# Patient Record
Sex: FEMALE | Race: BLACK OR AFRICAN AMERICAN
[De-identification: names, ages, dates, MRNs, and addresses within clinical notes are randomized per-mention and may not be internally consistent; named-entity substitution may affect disease eponyms.]

---

## 2017-10-20 ENCOUNTER — HOSPITAL ENCOUNTER (OUTPATIENT)
Dept: HOSPITAL 17 - NEPD | Age: 37
Setting detail: OBSERVATION
LOS: 1 days | Discharge: HOME | End: 2017-10-21
Attending: INTERNAL MEDICINE | Admitting: INTERNAL MEDICINE
Payer: MEDICARE

## 2017-10-20 VITALS — BODY MASS INDEX: 29.05 KG/M2 | HEIGHT: 66 IN | WEIGHT: 180.78 LBS

## 2017-10-20 DIAGNOSIS — R07.9: Primary | ICD-10-CM

## 2017-10-20 DIAGNOSIS — R56.9: ICD-10-CM

## 2017-10-20 DIAGNOSIS — Z79.899: ICD-10-CM

## 2017-10-20 DIAGNOSIS — R94.31: ICD-10-CM

## 2017-10-20 DIAGNOSIS — F41.9: ICD-10-CM

## 2017-10-20 DIAGNOSIS — F31.9: ICD-10-CM

## 2017-10-20 DIAGNOSIS — D64.9: ICD-10-CM

## 2017-10-20 PROCEDURE — 82550 ASSAY OF CK (CPK): CPT

## 2017-10-20 PROCEDURE — 85730 THROMBOPLASTIN TIME PARTIAL: CPT

## 2017-10-20 PROCEDURE — 93017 CV STRESS TEST TRACING ONLY: CPT

## 2017-10-20 PROCEDURE — 85610 PROTHROMBIN TIME: CPT

## 2017-10-20 PROCEDURE — 80048 BASIC METABOLIC PNL TOTAL CA: CPT

## 2017-10-20 PROCEDURE — 71010: CPT

## 2017-10-20 PROCEDURE — 82552 ASSAY OF CPK IN BLOOD: CPT

## 2017-10-20 PROCEDURE — 84702 CHORIONIC GONADOTROPIN TEST: CPT

## 2017-10-20 PROCEDURE — 93005 ELECTROCARDIOGRAM TRACING: CPT

## 2017-10-20 PROCEDURE — 85025 COMPLETE CBC W/AUTO DIFF WBC: CPT

## 2017-10-20 PROCEDURE — G0378 HOSPITAL OBSERVATION PER HR: HCPCS

## 2017-10-20 PROCEDURE — 99285 EMERGENCY DEPT VISIT HI MDM: CPT

## 2017-10-20 PROCEDURE — 84484 ASSAY OF TROPONIN QUANT: CPT

## 2017-10-21 VITALS
RESPIRATION RATE: 16 BRPM | TEMPERATURE: 98.5 F | SYSTOLIC BLOOD PRESSURE: 128 MMHG | HEART RATE: 83 BPM | DIASTOLIC BLOOD PRESSURE: 90 MMHG | OXYGEN SATURATION: 97 %

## 2017-10-21 VITALS — HEART RATE: 67 BPM

## 2017-10-21 VITALS
TEMPERATURE: 97.8 F | DIASTOLIC BLOOD PRESSURE: 73 MMHG | SYSTOLIC BLOOD PRESSURE: 122 MMHG | RESPIRATION RATE: 17 BRPM | HEART RATE: 81 BPM | OXYGEN SATURATION: 99 %

## 2017-10-21 VITALS
TEMPERATURE: 97.9 F | RESPIRATION RATE: 20 BRPM | DIASTOLIC BLOOD PRESSURE: 75 MMHG | OXYGEN SATURATION: 96 % | SYSTOLIC BLOOD PRESSURE: 128 MMHG | HEART RATE: 82 BPM

## 2017-10-21 VITALS — OXYGEN SATURATION: 97 %

## 2017-10-21 VITALS — HEART RATE: 85 BPM

## 2017-10-21 LAB
ANION GAP SERPL CALC-SCNC: 5 MEQ/L (ref 5–15)
APTT BLD: 25.8 SEC (ref 24.3–30.1)
BASOPHILS # BLD AUTO: 0.1 TH/MM3 (ref 0–0.2)
BASOPHILS # BLD AUTO: 0.1 TH/MM3 (ref 0–0.2)
BASOPHILS NFR BLD: 0.7 % (ref 0–2)
BASOPHILS NFR BLD: 1.1 % (ref 0–2)
BETA HCG QUANT: (no result) MIU/ML (ref 0–5)
BUN SERPL-MCNC: 12 MG/DL (ref 7–18)
CHLORIDE SERPL-SCNC: 108 MEQ/L (ref 98–107)
CK MB SERPL-MCNC: (no result) NG/ML (ref 0.5–3.6)
CK MB SERPL-MCNC: (no result) NG/ML (ref 0.5–3.6)
CK SERPL-CCNC: 105 U/L (ref 26–192)
CK SERPL-CCNC: 115 U/L (ref 26–192)
CK SERPL-CCNC: 137 U/L (ref 26–192)
EOSINOPHIL # BLD: 0.2 TH/MM3 (ref 0–0.4)
EOSINOPHIL # BLD: 0.2 TH/MM3 (ref 0–0.4)
EOSINOPHIL NFR BLD: 2.4 % (ref 0–4)
EOSINOPHIL NFR BLD: 2.6 % (ref 0–4)
ERYTHROCYTE [DISTWIDTH] IN BLOOD BY AUTOMATED COUNT: 14.1 % (ref 11.6–17.2)
ERYTHROCYTE [DISTWIDTH] IN BLOOD BY AUTOMATED COUNT: 14.1 % (ref 11.6–17.2)
GFR SERPLBLD BASED ON 1.73 SQ M-ARVRAT: 99 ML/MIN (ref 89–?)
HCO3 BLD-SCNC: 26.9 MEQ/L (ref 21–32)
HCT VFR BLD CALC: 28.6 % (ref 35–46)
HCT VFR BLD CALC: 30.5 % (ref 35–46)
HEMO FLAGS: (no result)
HEMO FLAGS: (no result)
INR PPP: 0.9 RATIO
LYMPHOCYTES # BLD AUTO: 1.7 TH/MM3 (ref 1–4.8)
LYMPHOCYTES # BLD AUTO: 2.2 TH/MM3 (ref 1–4.8)
LYMPHOCYTES NFR BLD AUTO: 27.1 % (ref 9–44)
LYMPHOCYTES NFR BLD AUTO: 30.3 % (ref 9–44)
MCH RBC QN AUTO: 28.5 PG (ref 27–34)
MCH RBC QN AUTO: 28.6 PG (ref 27–34)
MCHC RBC AUTO-ENTMCNC: 33.1 % (ref 32–36)
MCHC RBC AUTO-ENTMCNC: 33.9 % (ref 32–36)
MCV RBC AUTO: 84.3 FL (ref 80–100)
MCV RBC AUTO: 85.9 FL (ref 80–100)
MONOCYTES NFR BLD: 4.8 % (ref 0–8)
MONOCYTES NFR BLD: 5.3 % (ref 0–8)
NEUTROPHILS # BLD AUTO: 4 TH/MM3 (ref 1.8–7.7)
NEUTROPHILS # BLD AUTO: 4.5 TH/MM3 (ref 1.8–7.7)
NEUTROPHILS NFR BLD AUTO: 61.8 % (ref 16–70)
NEUTROPHILS NFR BLD AUTO: 63.9 % (ref 16–70)
PLATELET # BLD: 390 TH/MM3 (ref 150–450)
PLATELET # BLD: 482 TH/MM3 (ref 150–450)
POTASSIUM SERPL-SCNC: 3.9 MEQ/L (ref 3.5–5.1)
PROTHROMBIN TIME: 10.2 SEC (ref 9.8–11.6)
RBC # BLD AUTO: 3.33 MIL/MM3 (ref 4–5.3)
RBC # BLD AUTO: 3.62 MIL/MM3 (ref 4–5.3)
SODIUM SERPL-SCNC: 140 MEQ/L (ref 136–145)
WBC # BLD AUTO: 6.2 TH/MM3 (ref 4–11)
WBC # BLD AUTO: 7.3 TH/MM3 (ref 4–11)

## 2017-10-21 NOTE — HHI.HP
HPI


Primary Care Physician


No Primary Care Physician


Chief Complaint


Chest pain


History of Present Illness


37-year-old female with history of bipolar, anxiety, and seizure disorder 

presents to emergency room for further evaluation of chest pain.  Onset 8 PM.  

Nonexertional.  Location left anterior chest described as a tense pressure.  

Pain came on quickly.  Associated symptoms included dyspnea and slight nausea. 

No vomiting or diaphoresis. It did not hurt to take a deep breath.  Reports 

radiation to right anterior chest briefly while waiting for [facility] to arrive.  

Duration of left anterior chest pressure constant all night.  Precipitating 

factors possible anxiety attack, reports this discomfort not similar to past 

anxiety attacks.  No known relieving factors.  States discomfort gradually went 

away throughout evening.  Currently she is chest pain free. Has not taken any 

of her bipolar medications 1 month.





Review of Systems


General: No fatigue,weakness, fever, chills, recent illness, or change in 

appetite. Has been in her general state of health, does not feel she is under 

stress or particular anxiety.  


HEENT: No HA


CV: As stated above. No current chest pain or pressure. 


RESP: No SOB, cough, sputum production, or history of asthma


GI: No nausea, vomiting, or bowel changes. No diarrhea, constipation, or pain.  

No unintentional weight gain or weight loss. 


: No dysuria, urgency, frequency, or frequent UTIs. History of kidney stones.


GYN: Last menses early October. History of tubal ligation. Denies chance of 

pregnancy.  Reports regular, heavy periods lasting 7 days.


EXT: No lower leg edema, no paraesthesias


MS: No discomfort or change in ROM


NEURO: History of seizures, last seizure "many years ago." Reports seizures to 

be related to past traumatic injury. No dizziness, difficulty with balance, LOC

, motor/sensory deficits


PSYCH: History of bipolar disorder and anxiety, reports not taking medications 

for over one month due to the inability to pay for medications. follows with 

Anupam Toledo. No depression or suicidal ideation.





Past Family Social History


Allergies:  


Coded Allergies:  


     No Known Allergies (Verified , 10/21/17)


Past Medical History


Bipolar, anxiety, seizure


Past Surgical History


Tubal ligation


Reported Medications





Reported Meds & Active Scripts


Active


Reported has not taken any medications 1 month


Seroquel (Quetiapine Fumarate) 25 Mg Tab 25 Mg PO HS


Lamictal (Lamotrigine) 200 Mg Tab 200 Mg PO DAILY


Vistaril (Hydroxyzine Pamoate) 25 Mg Cap 25 Mg PO TID PRN


Lexapro (Escitalopram Oxalate) 10 Mg Tab 10 Mg PO DAILY


Active Ordered Medications





Current Medications








 Medications


  (Trade)  Dose


 Ordered  Sig/Renee


 Route  Start Time


 Stop Time Status Last Admin


 


  (NS Flush)  2 ml  UNSCH  PRN


 IV FLUSH  10/21/17 01:15


     


 


 


  (NS Flush)  2 ml  BID


 IV FLUSH  10/21/17 09:00


    10/21/17 07:42


 


 


  (Tylenol)  500 mg  Q4H  PRN


 PO  10/21/17 08:00


     


 


 


  (Zofran Inj)  4 mg  Q6H  PRN


 IV PUSH  10/21/17 08:00


     


 


 


  (Nitrostat Sl)  0.4 mg  Q5M  PRN


 SL  10/21/17 08:00


     


 


 


  (Aspirin)  325 mg  DAILY


 PO  10/22/17 09:00


     


 








Family History


Noncontributory for early onset cardiovascular disease


Social History


No known hypertension, diabetes, or hyperlipidemia.


Lifelong nonsmoker.  Rare alcohol use.  Denies any illegal drug use.


Endorses sedentary lifestyle.





Past cardiac testing


None





Physical Exam


Vital Signs





Vital Signs








  Date Time  Temp Pulse Resp B/P (MAP) Pulse Ox O2 Delivery O2 Flow Rate FiO2


 


10/21/17 09:08  67      


 


10/21/17 08:30 97.9 82 20 128/75 (92) 96   


 


10/21/17 04:06  85      


 


10/21/17 03:05 97.8 81 17 122/73 (89) 99   


 


10/21/17 01:49     97   


 


10/21/17 00:01 98.5 83 16 128/90 (103) 97   








Physical Exam


GENERAL: Alert WN, WD, NAD, pleasant,  female


HEAD: NC, AT


CV: RRR, without murmur, rub, gallop, no JVD, S1-S2 no S3-S4.  


RESP: Clear lungs throughout bilateral, no crackles, wheeze, rhonchi, 

symmetrical chest rise, nonlabored, able to speak in full sentences


ABD: Soft, NT, ND, no masses, positive bowel tones


BACK: No CVAT


EXT: Pulses +24, no dependent edema


MS: Normal tone 4 extremities, nontender, no obvious deformities, full range 

of motion


NEURO: CN II through CN XII grossly intact, motor strength 5/5, gait WNL


PSYCH: A+O 3, flat affect, appropriate speech, appropriate mood and affect, 

insight and judgment


SKIN: Normal turgor, normal texture, no lesions, no rashes, brisk cap refill


Laboratory





Laboratory Tests








Test


  10/21/17


00:15 10/21/17


03:10 10/21/17


05:10 10/21/17


07:02


 


White Blood Count 7.3    6.2 


 


Red Blood Count 3.62    3.33 


 


Hemoglobin 10.3    9.5 


 


Hematocrit 30.5    28.6 


 


Mean Corpuscular Volume 84.3    85.9 


 


Mean Corpuscular Hemoglobin 28.6    28.5 


 


Mean Corpuscular Hemoglobin


Concent 33.9 


  


  


  33.1 


 


 


Red Cell Distribution Width 14.1    14.1 


 


Platelet Count 482    390 


 


Mean Platelet Volume 7.9    8.0 


 


Neutrophils (%) (Auto) 61.8    63.9 


 


Lymphocytes (%) (Auto) 30.3    27.1 


 


Monocytes (%) (Auto) 4.8    5.3 


 


Eosinophils (%) (Auto) 2.4    2.6 


 


Basophils (%) (Auto) 0.7    1.1 


 


Neutrophils # (Auto) 4.5    4.0 


 


Lymphocytes # (Auto) 2.2    1.7 


 


Monocytes # (Auto) 0.4    0.3 


 


Eosinophils # (Auto) 0.2    0.2 


 


Basophils # (Auto) 0.1    0.1 


 


CBC Comment DIFF FINAL    DIFF FINAL 


 


Differential Comment      


 


Prothrombin Time 10.2    


 


Prothromb Time International


Ratio 0.9 


  


  


  


 


 


Activated Partial


Thromboplast Time 25.8 


  


  


  


 


 


Blood Urea Nitrogen 12    


 


Creatinine 0.80    


 


Random Glucose 108    


 


Calcium Level 8.0    


 


Sodium Level 140    


 


Potassium Level 3.9    


 


Chloride Level 108    


 


Carbon Dioxide Level 26.9    


 


Anion Gap 5    


 


Estimat Glomerular Filtration


Rate 99 


  


  


  


 


 


Total Creatine Kinase 137  105  115  


 


Creatine Kinase MB LESS THAN 0.5  LESS THAN 0.5   


 


Troponin I LESS THAN 0.02  LESS THAN 0.02  LESS THAN 0.02  


 


Human Chorionic Gonadotropin,


Quant LESS THAN 1 


  


  


  


 








Result Diagram:  


10/21/17 0702                                                                  

              10/21/17 0015





Imaging





Last Impressions








Chest X-Ray 10/21/17 0007 Signed





Impressions: 





 Service Date/Time:  2017 01:21 - CONCLUSION: No acute 





 disease.       Luis Manuel Arnold MD 








Course


EKG


Normal sinus rhythm, normal axis, no ST or T-segment changes





Caprini VTE Risk Assessment


Caprini VTE Risk Assessment:  No/Low Risk (score <= 1)


Caprini Risk Assessment Model











 Point Value = 1          Point Value = 2  Point Value = 3  Point Value = 5


 


Age 41-60


Minor surgery


BMI > 25 kg/m2


Swollen legs


Varicose veins


Pregnancy or postpartum


History of unexplained or recurrent


   spontaneous 


Oral contraceptives or hormone


   replacement


Sepsis (< 1 month)


Serious lung disease, including


   pneumonia (< 1 month)


Abnormal pulmonary function


Acute myocardial infarction


Congestive heart failure (< 1 month)


History of inflammatory bowel disease


Medical patient at bed rest Age 61-74


Arthroscopic surgery


Major open surgery (> 45 min)


Laparoscopic surgery (> 45 min)


Malignancy


Confined to bed (> 72 hours)


Immobilizing plaster cast


Central venous access Age >= 75


History of VTE


Family history of VTE


Factor V Leiden


Prothrombin [id number]


Lupus anticoagulant


Anticardiolipin antibodies


Elevated serum homocysteine


Heparin-induced thrombocytopenia


Other congenital or acquired


   thrombophilia Stroke (< 1 month)


Elective arthroplasty


Hip, pelvis, or leg fracture


Acute spinal cord injury (< 1 month)








Prophylaxis Regimen











   Total Risk


Factor Score Risk Level Prophylaxis Regimen


 


0-1      Low Early ambulation


 


2 Moderate Order ONE of the following:


*Sequential Compression Device (SCD)


*Heparin 5000 units SQ BID


 


3-4 Higher Order ONE of the following medications:


*Heparin 5000 units SQ TID


*Enoxaparin/Lovenox 40 mg SQ daily (WT < 150 kg, CrCl > 30 mL/min)


*Enoxaparin/Lovenox 30 mg SQ daily (WT < 150 kg, CrCl > 10-29 mL/min)


*Enoxaparin/Lovenox 30 mg SQ BID (WT < 150 kg, CrCl > 30 mL/min)


AND/OR


*Sequential Compression Device (SCD)


 


5 or more Highest Order ONE of the following medications:


*Heparin 5000 units SQ TID (Preferred with Epidurals)


*Enoxaparin/Lovenox 40 mg SQ daily (WT < 150 kg, CrCl > 30 mL/min)


*Enoxaparin/Lovenox 30 mg SQ daily (WT < 150 kg, CrCl > 10-29 mL/min)


*Enoxaparin/Lovenox 30 mg SQ BID (WT < 150 kg, CrCl > 30 mL/min)


AND


*Sequential Compression Device (SCD)











Assessment and Plan


Assessment and Plan


#1 Atypical chest pain-admitted to chest pain center.  Ruled out with 3 sets of 

EKGs, cardiac enzymes, and monitored overnight.  Seen and evaluated by Dr. Jc Grossman.  Completed exercise stress test which did not suggest ischemia. 

Reassurance provided. Plans to discharge home with follow to PCP. 


#2 Anxiety-continue home medications. Follows with Anupam Toledo, 

prescriptions are current. Reports medications are reasonable priced at $4.00/

each but due to her limited income is unable to afford. Case management placed 

for possible assistance with medications. Follow up with Anupam Toledo as 

previously instructed. 


#3 Anemia-anemia appears to be due to blood loss. No micro or macrocytic 

changes. History of anemia according to past laboratory results.  Highly 

encouraged and stressed the importance of establishing with a GYN, as well as a 

PCP.


Stressed the importance of obtaining a PCP for routine medical care.  

Encouraged to continue looking for a PCP that accepts her insurance.











Elizabeth Singh Oct 21, 2017 10:16

## 2017-10-21 NOTE — EKG
Date Performed: 10/21/2017       Time Performed: 03:15:18

 

PTAGE:      37 years

 

EKG:      Sinus rhythm 

 

 NORMAL ECG

 

PREVIOUS TRACING       : 08/28/2015 11.44 Since previous tracing, no significant change noted

 

DOCTOR:   Jc Grossman  Interpretating Date/Time  10/23/2017 08:13:28

## 2017-10-21 NOTE — HHI.DCPOC
Discharge Care Plan


Diagnosis:  


(1) Atypical chest pain


(2) Anxiety


Goals to Promote Your Health


* To prevent worsening of your condition and complications


* To maintain your health at the optimal level


Directions to Meet Your Goals


*** Take your medications as prescribed


*** Follow your dietary instruction


*** Follow activity as directed








*** Keep your appointments as scheduled


*** Take your immunizations and boosters as scheduled


*** If your symptoms worsen call your PCP, if no PCP go to Urgent Care Center 

or Emergency Room***


*** Smoking is Dangerous to Your Health. Avoid second hand smoke***


***Call the 24-hour hour crisis hotline for domestic abuse at 1-482.456.9099***











Elizabeth Singh Oct 21, 2017 12:49

## 2017-10-21 NOTE — PD
HPI


Chief Complaint:  Anxiety


Time Seen by Provider:  00:03


Travel History


International Travel<30 days:  No


Contact w/Intl Traveler<30days:  No





History of Present Illness


HPI


37-year-old female with history of bipolar disorder, anxiety, has not taken any 

of her medications including Lexapro and Seroquel in over a month, here by 

ambulance for evaluation of possible anxiety attack.  The patient states that 

she has not had an anxiety attack in over 4 months.  At around 9:30 PM she 

began to feel a tense sensation in her chest and became short of breath.  She 

states that the sensation went to her right side of her chest, and is now 

located on her left side of her chest.  The sensation is described as a tense 

feeling which is worse with movements and palpation.  She is no longer short of 

breath.  She feels somewhat improved.  No fevers, chills, cough, or recent 

illness.  No known history of cardiac disease.  No family history of cardiac 

disease.  She is a nonsmoker.





PFSH


Past Medical History


Blood Disorders:  No


Bipolar Disorder:  Yes


Anxiety:  Yes


Depression:  Yes


Cancer:  Yes (CERVIX-- PAP SMEAR RECOMMENDED Q 6 MOS )


Cardiovascular Problems:  No


Chemotherapy:  No


Cerebrovascular Accident:  No


Diminished Hearing:  No


Endocrine:  No


Glaucoma:  No


Genitourinary:  Yes


Immune Disorder:  No


Kidney Stones:  Yes


Musculoskeletal:  No


Neurologic:  No


Psychiatric:  Yes


Reproductive:  No


Respiratory:  No


Radiation Therapy:  No


Seizures:  Yes


PNEUMOCCOCAL Vaccine (Year):  2


Pregnant?:  Not Pregnant


LMP:  EARLY OCT 2017


:  5


Para:  3


Miscarriage:  2


:  0


Dilation and Curettage (D&C):  Yes


Tubal Ligation:  Yes





Past Surgical History


AICD:  No


Arteriovenous Shunt:  No


Genitourinary Surgery:  Yes (KIDNEY STONE)


Gynecologic Surgery:  Yes (CERCLAGE SURG X 2)


Insulin Pump:  No


Joint Replacement:  No


Pacemaker:  No


Other Surgery:  Yes





Social History


Alcohol Use:  Yes (OCC)


Tobacco Use:  No


Substance Use:  No





Allergies-Medications


(Allergen,Severity, Reaction):  


Coded Allergies:  


     No Known Allergies (Verified , 10/21/17)


Reported Meds & Prescriptions





Reported Meds & Active Scripts


Active


Reported


Seroquel (Quetiapine Fumarate) 25 Mg Tab 25 Mg PO HS


Lamictal (Lamotrigine) 200 Mg Tab 200 Mg PO DAILY


Vistaril (Hydroxyzine Pamoate) 25 Mg Cap 25 Mg PO TID PRN


Lexapro (Escitalopram Oxalate) 10 Mg Tab 10 Mg PO DAILY








Review of Systems


Except as stated in HPI:  all other systems reviewed are Neg





Physical Exam


Narrative


GENERAL: Well-developed, well-nourished, comfortable, no apparent distress.


SKIN: Focused skin assessment warm/dry.


HEAD: Atraumatic. Normocephalic. 


EYES: Pupils equal and round. No scleral icterus. No injection or drainage. 


ENT: Mucous membranes pink and moist.


NECK: Trachea midline. No JVD. 


CARDIOVASCULAR: Regular rate and rhythm.  Distal pulses brisk and equal 

bilaterally.


RESPIRATORY: No accessory muscle use. Clear to auscultation. Breath sounds 

equal bilaterally. 


GASTROINTESTINAL: Abdomen soft, non-tender, nondistended. 


MUSCULOSKELETAL: No obvious deformities. No clubbing.  No cyanosis.  No edema.  

Mild left anterior chest wall tenderness without crepitus, without step-off, 

without paradoxical chest wall movements.  


NEUROLOGICAL: Awake and alert. No obvious cranial nerve deficits.  Motor 

grossly within normal limits. Normal speech.


PSYCHIATRIC: Appropriate mood and affect; insight and judgment normal.





Data


Data


Last Documented VS





Vital Signs








  Date Time  Temp Pulse Resp B/P (MAP) Pulse Ox O2 Delivery O2 Flow Rate FiO2


 


10/21/17 00:01 98.5 83 16 128/90 (103) 97   








Orders





 Orders


Electrocardiogram (10/21/17 00:07)


Basic Metabolic Panel (Bmp) (10/21/17 00:07)


Ckmb (Isoenzyme) Profile (10/21/17 00:07)


Complete Blood Count With Diff (10/21/17 00:07)


Prothrombin Time / Inr (Pt) (10/21/17 00:07)


Act Partial Throm Time (Ptt) (10/21/17 00:07)


Troponin I (10/21/17 00:07)


Chest, Single Ap (10/21/17 00:07)


Ecg Monitoring (10/21/17 00:07)


Iv Access Insert/Monitor (10/21/17 00:07)


Oximetry (10/21/17 00:07)


Sodium Chloride 0.9% Flush (Ns Flush) (10/21/17 00:15)


Lorazepam (Ativan) (10/21/17 00:15)


Beta Hcg (Quant/Titer) (10/21/17 00:07)


CKMB (10/21/17 00:15)


CKMB% (10/21/17 00:15)





Labs





Laboratory Tests








Test


  10/21/17


00:15


 


White Blood Count 7.3 TH/MM3 


 


Red Blood Count 3.62 MIL/MM3 


 


Hemoglobin 10.3 GM/DL 


 


Hematocrit 30.5 % 


 


Mean Corpuscular Volume 84.3 FL 


 


Mean Corpuscular Hemoglobin 28.6 PG 


 


Mean Corpuscular Hemoglobin


Concent 33.9 % 


 


 


Red Cell Distribution Width 14.1 % 


 


Platelet Count 482 TH/MM3 


 


Mean Platelet Volume 7.9 FL 


 


Neutrophils (%) (Auto) 61.8 % 


 


Lymphocytes (%) (Auto) 30.3 % 


 


Monocytes (%) (Auto) 4.8 % 


 


Eosinophils (%) (Auto) 2.4 % 


 


Basophils (%) (Auto) 0.7 % 


 


Neutrophils # (Auto) 4.5 TH/MM3 


 


Lymphocytes # (Auto) 2.2 TH/MM3 


 


Monocytes # (Auto) 0.4 TH/MM3 


 


Eosinophils # (Auto) 0.2 TH/MM3 


 


Basophils # (Auto) 0.1 TH/MM3 


 


CBC Comment DIFF FINAL 


 


Differential Comment  


 


Prothrombin Time 10.2 SEC 


 


Prothromb Time International


Ratio 0.9 RATIO 


 


 


Activated Partial


Thromboplast Time 25.8 SEC 


 


 


Blood Urea Nitrogen 12 MG/DL 


 


Creatinine 0.80 MG/DL 


 


Random Glucose 108 MG/DL 


 


Calcium Level 8.0 MG/DL 


 


Sodium Level 140 MEQ/L 


 


Potassium Level 3.9 MEQ/L 


 


Chloride Level 108 MEQ/L 


 


Carbon Dioxide Level 26.9 MEQ/L 


 


Anion Gap 5 MEQ/L 


 


Estimat Glomerular Filtration


Rate 99 ML/MIN 


 


 


Total Creatine Kinase 137 U/L 


 


Creatine Kinase MB


  LESS THAN 0.5


NG/ML


 


Troponin I


  LESS THAN 0.02


NG/ML


 


Human Chorionic Gonadotropin,


Quant LESS THAN 1


MIU/ML











MDM


Medical Decision Making


Medical Screen Exam Complete:  Yes


Emergency Medical Condition:  Yes


Medical Record Reviewed:  Yes


Interpretation(s)


EKG: Sinus, rate 82, leftward axis, normal intervals, low QRS voltages in 

precordial leads, no acute ischemic abnormality.


Differential Diagnosis


ACS, pneumothorax, pericarditis, PE, pneumonia, anxiety


Narrative Course


Vital signs show heart rate 83, blood pressure 128/90, pulse ox 97% on room air

, oral temp of 98.5F.





CBC: WBC 7.3, hemoglobin 10.3, hematocrit 30.5, platelets 482.


BMP is unremarkable.


Cardiac enzymes are negative.


Beta hCG is negative.





Chest x-ray:


No acute disease.





Patient reports that her anxiety symptoms have somewhat improved after 

receiving Ativan.  She has had intermittent left-sided chest discomfort while 

in the emergency department.  Because of this I believe she is a good candidate 

for further cardiac evaluation in the chest pain center.  She is amenable to 

this plan.  She was given a full dose of aspirin.





Diagnosis





 Primary Impression:  


 Chest pain


 Qualified Codes:  R07.9 - Chest pain, unspecified


 Additional Impression:  


 Anxiety





Admitting Information


Admitting Physician Requests:  Alejandro Serra MD Oct 21, 2017 00:12

## 2017-10-21 NOTE — TR
Date Performed: 10/21/2017       Time Performed: 11:49:13

 

DOCTOR:      Jc Grossman 

 

DRUG LIST:     

CLINICAL HISTORY:      CHEST PAIN

REASON FOR TEST:      Chest pain

REASON FOR ENDING:     

OBSERVATION:     

CONCLUSION:      Sam protocol completed. Stopped sec to reaching target heart rate and leg fatigue.
 Maximum DQ=419 Target HR Achieved=86.0% Maximum CZ=529/80 Total Exercise Time=7:01. No reprod chest 
discomfort. No ectopy. No st t segment changes to sugg ischemia. Normal bp response. Good exercise to
lerance. Recovery quick. Reports "slight tense sensation left anterior chest, no radiation, no assoc 
symptoms, duration less minute. No correlation of chest discomfort with EKG changes.

COMMENTS:      Conclusion: Normal treadmill exercise.  No evidence of ischemia.

## 2017-10-21 NOTE — RADRPT
EXAM DATE/TIME:  10/21/2017 01:21 

 

[facility] COMPARISON:     

CHEST SINGLE AP, August 28, 2015, 11:50.

 

                     

INDICATIONS :     

Chest pain.

                     

 

MEDICAL HISTORY :            

Anxiety. Bipolar.   

 

SURGICAL HISTORY :     

None.   

 

ENCOUNTER:     

Initial                                        

 

ACUITY:     

1 day      

 

PAIN SCORE:     

3/10

 

LOCATION:     

Bilateral chest 

 

FINDINGS:     

A single view of the chest demonstrates the lungs to be symmetrically aerated without evidence of mas
s, infiltrate or effusion.  The cardiomediastinal contours are unremarkable.  Osseous structures are 
intact.

 

CONCLUSION:     No acute disease.  

 

 

 

 Luis Manuel Arnold MD on October 21, 2017 at 0:54           

Board Certified Radiologist.

 This report was verified electronically.

## 2017-10-21 NOTE — EKG
Date Performed: 10/21/2017       Time Performed: 00:29:45

 

PTAGE:      37 years

 

EKG:      Sinus rhythm 

 

 LOW QRS VOLTAGE IN PRECORDIAL LEADS BORDERLINE ECG INTERPRETATION BASED ON A DEFAULT AGE OF 40 YEARS
 Compared to 

 

 PREVIOUS TRACING             ,low voltage is new.

 

DOCTOR:   Jc Grossman  Interpretating Date/Time  10/21/2017 14:15:41

## 2017-10-21 NOTE — EKG
Date Performed: 10/21/2017       Time Performed: 06:10:06

 

PTAGE:      37 years

 

EKG:      Sinus rhythm 

 

 NORMAL ECG

 

PREVIOUS TRACING       : 10/21/2017 03.15 Since previous tracing, no significant change noted

 

DOCTOR:   Jc Grossman  Interpretating Date/Time  10/21/2017 14:09:41

## 2018-03-18 ENCOUNTER — HOSPITAL ENCOUNTER (OUTPATIENT)
Dept: HOSPITAL 17 - NEPE | Age: 38
Setting detail: OBSERVATION
LOS: 2 days | Discharge: HOME | End: 2018-03-20
Attending: HOSPITALIST | Admitting: HOSPITALIST
Payer: MEDICARE

## 2018-03-18 VITALS
SYSTOLIC BLOOD PRESSURE: 123 MMHG | HEART RATE: 81 BPM | RESPIRATION RATE: 16 BRPM | DIASTOLIC BLOOD PRESSURE: 60 MMHG | TEMPERATURE: 97.8 F | OXYGEN SATURATION: 100 %

## 2018-03-18 VITALS
SYSTOLIC BLOOD PRESSURE: 103 MMHG | HEART RATE: 118 BPM | DIASTOLIC BLOOD PRESSURE: 62 MMHG | TEMPERATURE: 100.3 F | OXYGEN SATURATION: 99 % | RESPIRATION RATE: 16 BRPM

## 2018-03-18 VITALS
SYSTOLIC BLOOD PRESSURE: 115 MMHG | HEART RATE: 120 BPM | RESPIRATION RATE: 17 BRPM | DIASTOLIC BLOOD PRESSURE: 64 MMHG | OXYGEN SATURATION: 99 % | TEMPERATURE: 99.4 F

## 2018-03-18 VITALS
SYSTOLIC BLOOD PRESSURE: 122 MMHG | DIASTOLIC BLOOD PRESSURE: 69 MMHG | TEMPERATURE: 100.2 F | HEART RATE: 124 BPM | OXYGEN SATURATION: 97 % | RESPIRATION RATE: 18 BRPM

## 2018-03-18 VITALS
SYSTOLIC BLOOD PRESSURE: 155 MMHG | DIASTOLIC BLOOD PRESSURE: 80 MMHG | RESPIRATION RATE: 17 BRPM | OXYGEN SATURATION: 98 % | HEART RATE: 115 BPM

## 2018-03-18 VITALS
DIASTOLIC BLOOD PRESSURE: 75 MMHG | RESPIRATION RATE: 16 BRPM | HEART RATE: 111 BPM | OXYGEN SATURATION: 99 % | TEMPERATURE: 98.9 F | SYSTOLIC BLOOD PRESSURE: 122 MMHG

## 2018-03-18 VITALS — BODY MASS INDEX: 22.72 KG/M2 | WEIGHT: 158.73 LBS | HEIGHT: 70 IN

## 2018-03-18 DIAGNOSIS — F31.9: ICD-10-CM

## 2018-03-18 DIAGNOSIS — N20.0: Primary | ICD-10-CM

## 2018-03-18 DIAGNOSIS — F12.90: ICD-10-CM

## 2018-03-18 DIAGNOSIS — N13.2: ICD-10-CM

## 2018-03-18 DIAGNOSIS — F41.9: ICD-10-CM

## 2018-03-18 DIAGNOSIS — N30.00: ICD-10-CM

## 2018-03-18 DIAGNOSIS — Z85.41: ICD-10-CM

## 2018-03-18 LAB
ALBUMIN SERPL-MCNC: 3.6 GM/DL (ref 3.4–5)
ALP SERPL-CCNC: 62 U/L (ref 45–117)
ALT SERPL-CCNC: 15 U/L (ref 10–53)
AMORPHOUS SEDIMENT, URINE: (no result)
AST SERPL-CCNC: 14 U/L (ref 15–37)
BACTERIA #/AREA URNS HPF: (no result) /HPF
BASOPHILS # BLD AUTO: 0.1 TH/MM3 (ref 0–0.2)
BASOPHILS NFR BLD: 0.6 % (ref 0–2)
BILIRUB SERPL-MCNC: 0.2 MG/DL (ref 0.2–1)
BUN SERPL-MCNC: 12 MG/DL (ref 7–18)
CALCIUM SERPL-MCNC: 8.3 MG/DL (ref 8.5–10.1)
CHLORIDE SERPL-SCNC: 106 MEQ/L (ref 98–107)
COLOR UR: YELLOW
CREAT SERPL-MCNC: 1.16 MG/DL (ref 0.5–1)
EOSINOPHIL # BLD: 0.1 TH/MM3 (ref 0–0.4)
EOSINOPHIL NFR BLD: 0.9 % (ref 0–4)
ERYTHROCYTE [DISTWIDTH] IN BLOOD BY AUTOMATED COUNT: 13.5 % (ref 11.6–17.2)
GFR SERPLBLD BASED ON 1.73 SQ M-ARVRAT: 64 ML/MIN (ref 89–?)
GLUCOSE SERPL-MCNC: 130 MG/DL (ref 74–106)
GLUCOSE UR STRIP-MCNC: (no result) MG/DL
HCO3 BLD-SCNC: 25.1 MEQ/L (ref 21–32)
HCT VFR BLD CALC: 31.8 % (ref 35–46)
HGB BLD-MCNC: 10.5 GM/DL (ref 11.6–15.3)
HGB UR QL STRIP: (no result)
KETONES UR STRIP-MCNC: (no result) MG/DL
LYMPHOCYTES # BLD AUTO: 1.9 TH/MM3 (ref 1–4.8)
LYMPHOCYTES NFR BLD AUTO: 17.3 % (ref 9–44)
MCH RBC QN AUTO: 28.6 PG (ref 27–34)
MCHC RBC AUTO-ENTMCNC: 33.2 % (ref 32–36)
MCV RBC AUTO: 86.2 FL (ref 80–100)
MONOCYTE #: 0.4 TH/MM3 (ref 0–0.9)
MONOCYTES NFR BLD: 3.3 % (ref 0–8)
MUCOUS THREADS #/AREA URNS LPF: (no result) /LPF
NEUTROPHILS # BLD AUTO: 8.6 TH/MM3 (ref 1.8–7.7)
NEUTROPHILS NFR BLD AUTO: 77.9 % (ref 16–70)
NITRITE UR QL STRIP: (no result)
PLATELET # BLD: 509 TH/MM3 (ref 150–450)
PMV BLD AUTO: 7.3 FL (ref 7–11)
PROT SERPL-MCNC: 8.2 GM/DL (ref 6.4–8.2)
RBC # BLD AUTO: 3.69 MIL/MM3 (ref 4–5.3)
SODIUM SERPL-SCNC: 140 MEQ/L (ref 136–145)
SP GR UR STRIP: 1.02 (ref 1–1.03)
SQUAMOUS #/AREA URNS HPF: 8 /HPF (ref 0–5)
URINE LEUKOCYTE ESTERASE: (no result)
WBC # BLD AUTO: 11 TH/MM3 (ref 4–11)

## 2018-03-18 PROCEDURE — 87077 CULTURE AEROBIC IDENTIFY: CPT

## 2018-03-18 PROCEDURE — 96366 THER/PROPH/DIAG IV INF ADDON: CPT

## 2018-03-18 PROCEDURE — 96365 THER/PROPH/DIAG IV INF INIT: CPT

## 2018-03-18 PROCEDURE — 74176 CT ABD & PELVIS W/O CONTRAST: CPT

## 2018-03-18 PROCEDURE — 96361 HYDRATE IV INFUSION ADD-ON: CPT

## 2018-03-18 PROCEDURE — 85025 COMPLETE CBC W/AUTO DIFF WBC: CPT

## 2018-03-18 PROCEDURE — 80048 BASIC METABOLIC PNL TOTAL CA: CPT

## 2018-03-18 PROCEDURE — 87186 SC STD MICRODIL/AGAR DIL: CPT

## 2018-03-18 PROCEDURE — 99285 EMERGENCY DEPT VISIT HI MDM: CPT

## 2018-03-18 PROCEDURE — 81001 URINALYSIS AUTO W/SCOPE: CPT

## 2018-03-18 PROCEDURE — 97161 PT EVAL LOW COMPLEX 20 MIN: CPT

## 2018-03-18 PROCEDURE — 83690 ASSAY OF LIPASE: CPT

## 2018-03-18 PROCEDURE — 83735 ASSAY OF MAGNESIUM: CPT

## 2018-03-18 PROCEDURE — 84703 CHORIONIC GONADOTROPIN ASSAY: CPT

## 2018-03-18 PROCEDURE — 80053 COMPREHEN METABOLIC PANEL: CPT

## 2018-03-18 PROCEDURE — 96375 TX/PRO/DX INJ NEW DRUG ADDON: CPT

## 2018-03-18 PROCEDURE — G0378 HOSPITAL OBSERVATION PER HR: HCPCS

## 2018-03-18 PROCEDURE — 87086 URINE CULTURE/COLONY COUNT: CPT

## 2018-03-18 RX ADMIN — Medication SCH ML: at 22:12

## 2018-03-18 RX ADMIN — ESCITALOPRAM OXALATE SCH MG: 10 TABLET, FILM COATED ORAL at 14:09

## 2018-03-18 RX ADMIN — STANDARDIZED SENNA CONCENTRATE AND DOCUSATE SODIUM SCH TAB: 8.6; 5 TABLET, FILM COATED ORAL at 22:12

## 2018-03-18 RX ADMIN — Medication SCH ML: at 09:55

## 2018-03-18 RX ADMIN — STANDARDIZED SENNA CONCENTRATE AND DOCUSATE SODIUM SCH TAB: 8.6; 5 TABLET, FILM COATED ORAL at 09:00

## 2018-03-18 RX ADMIN — PHENYTOIN SODIUM SCH MLS/HR: 50 INJECTION INTRAMUSCULAR; INTRAVENOUS at 17:59

## 2018-03-18 RX ADMIN — PHENYTOIN SODIUM SCH MLS/HR: 50 INJECTION INTRAMUSCULAR; INTRAVENOUS at 09:55

## 2018-03-18 NOTE — PD
HPI


Chief Complaint:  Abdominal Pain


Time Seen by Provider:  04:23


Travel History


International Travel<30 days:  No


Contact w/Intl Traveler<30days:  No


Traveled to known affect area:  No





History of Present Illness


HPI


Patient is a 37-year-old female comes in complaining of left-sided abdominal 

pain that started this morning.  She says it is severe pain.  She denies any 

nausea or vomiting.  She denies fever chills.  She says she moved her bowels 

today and it was normal.  She does have history of kidney stones in the past.  

She denies any dysuria.  Severity is moderate.  She did take some ibuprofen 

prior to coming in without relief of her pain.





PFSH


Past Medical History


Blood Disorders:  No


Bipolar Disorder:  Yes


Anxiety:  Yes


Depression:  Yes


Cancer:  Yes (CERVIX-- PAP SMEAR RECOMMENDED Q 6 MOS )


Cardiovascular Problems:  No


Chemotherapy:  No


Cerebrovascular Accident:  No


Diminished Hearing:  No


Endocrine:  No


Glaucoma:  No


Genitourinary:  Yes


Immune Disorder:  No


Kidney Stones:  Yes


Musculoskeletal:  No


Neurologic:  No


Psychiatric:  Yes


Reproductive:  No


Respiratory:  No


Radiation Therapy:  No


Seizures:  Yes


Influenza Vaccination:  No


PNEUMOCCOCAL Vaccine (Year):  2


Pregnant?:  Unknown


:  5


Para:  3


Miscarriage:  2


:  0


Dilation and Curettage (D&C):  Yes


Tubal Ligation:  Yes





Past Surgical History


AICD:  No


Arteriovenous Shunt:  No


Genitourinary Surgery:  Yes (KIDNEY STONE)


Gynecologic Surgery:  Yes (CERCLAGE SURG X 2)


Insulin Pump:  No


Joint Replacement:  No


Pacemaker:  No


Other Surgery:  Yes





Social History


Alcohol Use:  Yes (OCC)


Tobacco Use:  No


Substance Use:  No





Allergies-Medications


(Allergen,Severity, Reaction):  


Coded Allergies:  


     No Known Allergies (Verified  Adverse Reaction, Unknown, 3/18/18)


Reported Meds & Prescriptions





Reported Meds & Active Scripts


Active


Reported


Seroquel (Quetiapine Fumarate) 25 Mg Tab 25 Mg PO HS


Lamictal (Lamotrigine) 200 Mg Tab 200 Mg PO DAILY


Lexapro (Escitalopram Oxalate) 10 Mg Tab 10 Mg PO DAILY








Review of Systems


Except as stated in HPI:  all other systems reviewed are Neg


General / Constitutional:  No: Fever, Chills


HENT:  No: Headaches, Lightheadedness


Cardiovascular:  No: Chest Pain or Discomfort


Respiratory:  No: Shortness of Breath


Gastrointestinal:  Positive: Abdominal Pain, No: Nausea, Vomiting


Genitourinary:  No: Frequency, Dysuria


Musculoskeletal:  No: Myalgias


Skin:  No Rash, No Change in Pigmentation


Neurologic:  No: Weakness, Dizziness





Physical Exam


Narrative


GENERAL: Awake and alert, no acute distress.


SKIN: Focused skin assessment warm/dry.  No wounds or signs of infection.


HEAD: Atraumatic. Normocephalic. 


EYES: Pupils equal and round. No scleral icterus. 


ENT: Mucous membranes pink and moist.


NECK: Trachea midline. No JVD. 


CARDIOVASCULAR: Regular rate and rhythm.  No murmur appreciated.


RESPIRATORY: No accessory muscle use. Clear to auscultation. Breath sounds 

equal bilaterally. 


GASTROINTESTINAL: Abdomen soft, non-tender, nondistended. 


MUSCULOSKELETAL: No obvious deformities. No clubbing.  No cyanosis.  No edema. 


NEUROLOGICAL: Awake and alert. No obvious cranial nerve deficits.  Motor 

grossly within normal limits. Normal speech.


PSYCHIATRIC: Appropriate mood and affect; insight and judgment normal.





Data


Data


Last Documented VS





Vital Signs








  Date Time  Temp Pulse Resp B/P (MAP) Pulse Ox O2 Delivery O2 Flow Rate FiO2


 


3/18/18 02:10 97.8 81 16 123/60 (81) 100   








Orders





 Orders


Basic Metabolic Panel (Bmp) (3/18/18 03:39)


Complete Blood Count With Diff (3/18/18 03:39)


Comprehensive Metabolic Panel (3/18/18 03:39)


Lipase (3/18/18 03:39)


Urinalysis - C+S If Indicated (3/18/18 03:39)


Iv Access Insert/Monitor (3/18/18 03:39)


Ed Urine Pregnancytest Poc (3/18/18 03:39)


Urine Culture (3/18/18 03:45)


Ketorolac Inj (Toradol Inj) (3/18/18 04:15)


Sodium Chlor 0.9% 1000 Ml Inj (Ns 1000 M (3/18/18 04:15)


Morphine Inj (Morphine Inj) (3/18/18 04:15)


Ct Abd/Pel W/O Iv Contrast (3/18/18 )


Ceftriaxone Inj (Rocephin Inj) (3/18/18 06:00)


Admit Order (Ed Use Only) (3/18/18 )





Labs





Laboratory Tests








Test


  3/18/18


03:45


 


White Blood Count 11.0 TH/MM3 


 


Red Blood Count 3.69 MIL/MM3 


 


Hemoglobin 10.5 GM/DL 


 


Hematocrit 31.8 % 


 


Mean Corpuscular Volume 86.2 FL 


 


Mean Corpuscular Hemoglobin 28.6 PG 


 


Mean Corpuscular Hemoglobin


Concent 33.2 % 


 


 


Red Cell Distribution Width 13.5 % 


 


Platelet Count 509 TH/MM3 


 


Mean Platelet Volume 7.3 FL 


 


Neutrophils (%) (Auto) 77.9 % 


 


Lymphocytes (%) (Auto) 17.3 % 


 


Monocytes (%) (Auto) 3.3 % 


 


Eosinophils (%) (Auto) 0.9 % 


 


Basophils (%) (Auto) 0.6 % 


 


Neutrophils # (Auto) 8.6 TH/MM3 


 


Lymphocytes # (Auto) 1.9 TH/MM3 


 


Monocytes # (Auto) 0.4 TH/MM3 


 


Eosinophils # (Auto) 0.1 TH/MM3 


 


Basophils # (Auto) 0.1 TH/MM3 


 


CBC Comment DIFF FINAL 


 


Differential Comment  


 


Urine Color YELLOW 


 


Urine Turbidity HAZY 


 


Urine pH 7.5 


 


Urine Specific Gravity 1.025 


 


Urine Protein 30 mg/dL 


 


Urine Glucose (UA) NEG mg/dL 


 


Urine Ketones NEG mg/dL 


 


Urine Occult Blood NEG 


 


Urine Nitrite POS 


 


Urine Bilirubin NEG 


 


Urine Urobilinogen


  LESS THAN 2.0


MG/DL


 


Urine Leukocyte Esterase LARGE 


 


Urine RBC 4 /hpf 


 


Urine WBC 68 /hpf 


 


Urine Squamous Epithelial


Cells 8 /hpf 


 


 


Urine Amorphous Sediment OCC 


 


Urine Bacteria MOD /hpf 


 


Urine Mucus FEW /lpf 


 


Microscopic Urinalysis Comment


  CULTURE


INDICATED


 


Blood Urea Nitrogen 12 MG/DL 


 


Creatinine 1.16 MG/DL 


 


Random Glucose 130 MG/DL 


 


Total Protein 8.2 GM/DL 


 


Albumin 3.6 GM/DL 


 


Calcium Level 8.3 MG/DL 


 


Alkaline Phosphatase 62 U/L 


 


Aspartate Amino Transf


(AST/SGOT) 14 U/L 


 


 


Alanine Aminotransferase


(ALT/SGPT) 15 U/L 


 


 


Total Bilirubin 0.2 MG/DL 


 


Sodium Level 140 MEQ/L 


 


Potassium Level 3.4 MEQ/L 


 


Chloride Level 106 MEQ/L 


 


Carbon Dioxide Level 25.1 MEQ/L 


 


Anion Gap 9 MEQ/L 


 


Estimat Glomerular Filtration


Rate 64 ML/MIN 


 


 


Lipase 117 U/L 











[facility]


Medical Decision Making


Medical Screen Exam Complete:  Yes


Emergency Medical Condition:  Yes


Medical Record Reviewed:  Yes


Differential Diagnosis


UTI versus pyelonephritis versus renal stone


Narrative Course


Patient is a 37-year-old female comes in complaining of left-sided abdominal 

pain.  Pain cannot be elicited on exam.  IV established, labs sent.  Urinalysis 

is positive for UTI.





CT abdomen and pelvis shows a obstructing renal stone.





Given IV fluids, Rocephin.  She will be admitted for further management.


Last 24 hours Impressions








Abdomen/Pelvis CT 3/18/18 0000 Signed





Impressions: 





 Service Date/Time:  2018 05:59 - CONCLUSION:  1. There 

appears 





 to be some mild pelvocaliectasis of the left renal collecting system with mild 





 hydroureter of uncertain etiology. No obvious stone disease identified in the 





 distal ureter. 2. 4 mm calcification adjacent to the left ureter at the 





 lumbosacral junction I believe is a phlebolith associated with the left 

gonadal 





 vein. 3. 4 x 7 mm stone in the lower pole collecting system of the left 

kidney.  





    Alonzo Browne MD 











Diagnosis





 Primary Impression:  


 Kidney stone


 Additional Impression:  


 UTI (urinary tract infection)


 Qualified Codes:  N30.00 - Acute cystitis without hematuria





Admitting Information


Admitting Physician Requests:  Admit


Scripts


Cefuroxime (Ceftin) 250 Mg Tab


250 MG PO BID for Infection for 7 Days, #14 TAB


   Prov: Chris Saenz [facility]         3/19/18











Vivienne Mary MD Mar 18, 2018 08:20

## 2018-03-18 NOTE — PD.CONS
[facility]


Service


Urology


Consult Requested By


Dr. Nelyl Jiang


Reason for Consult


Left renal calculus


Primary Care Physician


No Primary Care Physician


Diagnosis:  


History of Present Illness


37-year-old female with history nephrolithiasis who presented to the emergency 

room with left abdominal pain.  Workup included a CT scan of the abdomen and 

pelvis that demonstrated a 7 x 4 mm left lower pole renal calculus.  Also noted 

was mild left-sided hydroureter and pelvocaliectasis.  Also noted was a 

calcification adjacent to the left ureter and likely to be a phlebolith of the 

gonadal vein.  Patient had a CT scan of the abdomen and pelvis back in November 2013 with similar findings.  At the time of consultation the patient appeared 

comfortable and was not in any acute distress.  She denies hematuria or 

dysuria.  She denies nausea or vomiting.  Urinalysis was consistent with a 

urinary tract infection and a sample submitted for culture and sensitivity.





Review of Systems


Constitutional:  DENIES: Fever, Chills


Cardiovascular:  DENIES: Chest pain


Gastrointestinal:  COMPLAINS OF: Abdominal pain (Left side)


Genitourinary:  DENIES: Hematuria, Dysuria


Musculoskeletal:  COMPLAINS OF: Back pain (Left flank)


Except as stated in HPI:  all other systems reviewed are Neg





Past Family Social History


Past Medical History


Cervical CA


Nephrolithiasis


Past Surgical History


Status post extracorporeal shockwave lithotripsy


Status post cervical cerclage 2


Status post D&C


Reported Medications


Refer to EMR


Allergies:  


Coded Allergies:  


     No Known Allergies (Verified  Adverse Reaction, Unknown, 3/18/18)


Active Ordered Medications


Refer to EMR


Family History


Reviewed and noncontributory


Social History


Occasional alcohol use


Denies tobacco or intravenous drug abuse





Physical Exam





Vital Signs








  Date Time  Temp Pulse Resp B/P (MAP) Pulse Ox O2 Delivery O2 Flow Rate FiO2


 


3/18/18 09:51 98.9 111 16 122/75 (91) 99   


 


3/18/18 09:12        


 


3/18/18 08:57  115 17 155/80 (105) 98 Room Air  


 


3/18/18 02:10 97.8 81 16 123/60 (81) 100   








Physical Exam


GENERAL: This is a well-nourished, well-developed patient, in no apparent 

distress.


SKIN: No rashes, ecchymoses or lesions. Cool and dry.


HEAD: Atraumatic. Normocephalic. No temporal or scalp tenderness.


EYES: Pupils equal round and reactive. Extraocular motions intact. No scleral 

icterus. No injection or drainage. 


ENT: Nose without bleeding, purulent drainage or septal hematoma. Throat 

without erythema, tonsillar hypertrophy or exudate. Uvula midline. Airway 

patent.


NECK: Trachea midline. No JVD or lymphadenopathy. Supple, nontender, no 

meningeal signs.


CARDIOVASCULAR: Regular rate and rhythm without murmurs, gallops, or rubs. 


RESPIRATORY: Clear to auscultation. Breath sounds equal bilaterally. No wheezes

, rales, or rhonchi.  


GASTROINTESTINAL: Abdomen soft, non-tender, nondistended. No hepato-splenomegaly

, or palpable masses. No guarding.


GENITOURINARY: No CVA tenderness, bladder not distended


MUSCULOSKELETAL: Extremities without clubbing, cyanosis, or edema. No joint 

tenderness, effusion, or edema noted. No calf tenderness. Negative Homans sign 

bilaterally.


NEUROLOGICAL: Awake and alert. Cranial nerves II through XII intact.  Motor and 

sensory grossly within normal limits. Five out of 5 muscle strength in all 

muscle groups.  Normal speech.


Lab results reviewed:  Yes





Laboratory Tests








Test


  3/18/18


03:45


 


White Blood Count 11.0 


 


Red Blood Count 3.69 


 


Hemoglobin 10.5 


 


Hematocrit 31.8 


 


Mean Corpuscular Volume 86.2 


 


Mean Corpuscular Hemoglobin 28.6 


 


Mean Corpuscular Hemoglobin


Concent 33.2 


 


 


Red Cell Distribution Width 13.5 


 


Platelet Count 509 


 


Mean Platelet Volume 7.3 


 


Neutrophils (%) (Auto) 77.9 


 


Lymphocytes (%) (Auto) 17.3 


 


Monocytes (%) (Auto) 3.3 


 


Eosinophils (%) (Auto) 0.9 


 


Basophils (%) (Auto) 0.6 


 


Neutrophils # (Auto) 8.6 


 


Lymphocytes # (Auto) 1.9 


 


Monocytes # (Auto) 0.4 


 


Eosinophils # (Auto) 0.1 


 


Basophils # (Auto) 0.1 


 


CBC Comment DIFF FINAL 


 


Differential Comment  


 


Urine Color YELLOW 


 


Urine Turbidity HAZY 


 


Urine pH 7.5 


 


Urine Specific Gravity 1.025 


 


Urine Protein 30 


 


Urine Glucose (UA) NEG 


 


Urine Ketones NEG 


 


Urine Occult Blood NEG 


 


Urine Nitrite POS 


 


Urine Bilirubin NEG 


 


Urine Urobilinogen LESS THAN 2.0 


 


Urine Leukocyte Esterase LARGE 


 


Urine RBC 4 


 


Urine WBC 68 


 


Urine Squamous Epithelial


Cells 8 


 


 


Urine Amorphous Sediment OCC 


 


Urine Bacteria MOD 


 


Urine Mucus FEW 


 


Microscopic Urinalysis Comment


  CULTURE


INDICATED


 


Blood Urea Nitrogen 12 


 


Creatinine 1.16 


 


Random Glucose 130 


 


Total Protein 8.2 


 


Albumin 3.6 


 


Calcium Level 8.3 


 


Alkaline Phosphatase 62 


 


Aspartate Amino Transf


(AST/SGOT) 14 


 


 


Alanine Aminotransferase


(ALT/SGPT) 15 


 


 


Total Bilirubin 0.2 


 


Sodium Level 140 


 


Potassium Level 3.4 


 


Chloride Level 106 


 


Carbon Dioxide Level 25.1 


 


Anion Gap 9 


 


Estimat Glomerular Filtration


Rate 64 


 


 


Lipase 117 














 Date/Time


Source Procedure


Growth Status


 


 


 3/18/18 03:45


Urine Random Urine Urine Culture


Pending Received








Result Diagram:  


3/18/18 0345                                                                   

             3/18/18 0345





Personally reviewed images:  Yes





Assessment and Plan


Assessment and Plan


Urologic impression:


1.  7 mm left lower pole renal calculus


2.  Possible recent kidney stone passage


3.  Rule out urinary tract infection





Recommendations:


1.  Urine for culture sensitivity


2.  Antibiotic therapy for UTI


3.  Urologically cleared to discharge home when pain managed with oral meds


4.  Patient to follow-up with me in the office within the next 2 weeks to make 

arrangements for extracorporeal shockwave lithotripsy of the left renal calculus











Marcelo Calle MD Mar 18, 2018 12:02

## 2018-03-18 NOTE — HHI.HP
__________________________________________________





HPI


Service


[facility]


Primary Care Physician


No Primary Care Physician


Admission Diagnosis





UTI, obstructing renal stone


Diagnoses:  


Chief Complaint:  


Pain left flank, severe


Travel History


International Travel<30 Days:  No


Contact w/Intl Traveler <30 Da:  No


Traveled to Known Affected Are:  No


History of Present Illness


Written by Chris Qiu, acting as scribe for Dr. Jiang on 3/18/18 at 12:03. 





Patient is a 37-year-old female with primary medical history of anxiety, 

depression, bipolar disorder who came into the hospital for complaints of 

severe flank pain.  Patient states that she was celebrating last night for 's Day and had some vodka, but at around 2:00 in the morning she was 

having some severe pain on the left side area, flank area.  On exam, states 

that the pain has improved after getting antibiotics and pain medication.  

States that this pain that she had is not actually worse than when she had her 

previous kidney stone.  Denies SOB/ dyspnea.  Denies chest pain, palpitations, 

headaches, dizziness. Denies fevers, chills, n/v/d.  Denies hematuria, dysuria.





Review of Systems


Except as stated in HPI:  all other systems reviewed are Neg





Past Family Social History


Past Medical History


Anxiety


Depression


Bipolar disorder


Past Surgical History


Cerclage surgery 2


Lithotripsy


Tubal ligation


Reported Medications





Reported Meds & Active Scripts


Active


Reported


Seroquel (Quetiapine Fumarate) 25 Mg Tab 25 Mg PO HS


Lamictal (Lamotrigine) 200 Mg Tab 200 Mg PO DAILY


Lexapro (Escitalopram Oxalate) 10 Mg Tab 10 Mg PO DAILY


Allergies:  


Coded Allergies:  


     No Known Allergies (Verified  Adverse Reaction, Unknown, 3/18/18)


Active Ordered Medications





Current Medications








 Medications


  (Trade)  Dose


 Ordered  Sig/Ernee


 Route  Start Time


 Stop Time Status Last Admin


 


 Sodium Chloride  1,000 ml @ 


 100 mls/hr  Q10H


 IV  3/18/18 08:00


    3/18/18 09:55


 


 


  (NS Flush)  2 ml  UNSCH  PRN


 IV FLUSH  3/18/18 08:15


     


 


 


  (NS Flush)  2 ml  BID


 IV FLUSH  3/18/18 09:00


    3/18/18 09:55


 


 


  (Tylenol)  650 mg  Q4H  PRN


 PO  3/18/18 08:15


     


 


 


  (Zofran Inj)  4 mg  Q6H  PRN


 IVP  3/18/18 08:15


     


 


 


  (Norco  5-325 Mg)  1 tab  Q4H  PRN


 PO  3/18/18 08:15


     


 


 


  (Norco  Mg)  1 tab  Q4H  PRN


 PO  3/18/18 08:15


     


 


 


  (Morphine Inj)  2 mg  Q4H  PRN


 IV PUSH  3/18/18 08:15


     


 


 


  (Narcan Inj)  0.4 mg  UNSCH  PRN


 IV PUSH  3/18/18 08:15


     


 


 


  (Dhara-Colace)  1 tab  BID


 PO  3/18/18 09:00


     


 


 


  (Milk Of


 Magnesia Liq)  30 ml  Q12H  PRN


 PO  3/18/18 08:15


     


 


 


  (Senokot)  17.2 mg  Q12H  PRN


 PO  3/18/18 08:15


     


 


 


  (Dulcolax Supp)  10 mg  DAILY  PRN


 RECTAL  3/18/18 08:15


     


 


 


  (Lactulose Liq)  30 ml  DAILY  PRN


 PO  3/18/18 08:15


     


 


 


 Ceftriaxone


 Sodium 1000 mg/


 Sodium Chloride  100 ml @ 


 200 mls/hr  Q24H


 IV  3/19/18 08:00


     


 








Family History


Denies any significant family medical history


Social History


Occasional alcohol use last use,  vodka last night


Denies tobacco use


Smokes marijuana, 2 times per day 3 times a week





Physical Exam


Vital Signs





Vital Signs








  Date Time  Temp Pulse Resp B/P (MAP) Pulse Ox O2 Delivery O2 Flow Rate FiO2


 


3/18/18 09:51 98.9 111 16 122/75 (91) 99   


 


3/18/18 09:12        


 


3/18/18 08:57  115 17 155/80 (105) 98 Room Air  


 


3/18/18 02:10 97.8 81 16 123/60 (81) 100   








Physical Exam


GENERAL: This is a well-nourished, well-developed patient, in no apparent 

distress.


SKIN: No rashes, ecchymoses or lesions. Cool and dry.


HEAD:  Normocephalic.


EYES: Pupils equal round and reactive. Extraocular motions intact. No scleral 

icterus. No injection or drainage. 


ENT: Nose without bleeding. Throat without erythema. Uvula midline. Airway 

patent.


NECK: Trachea midline. 


CARDIOVASCULAR: Regular rate and rhythm without murmurs, gallops, or rubs. 


RESPIRATORY: Clear to auscultation. Breath sounds equal bilaterally. No wheezes

, rales, or rhonchi.  


GASTROINTESTINAL: Abdomen soft, nondistended.  Mild tenderness to palpate left 

quadrant, flank area.  Bowel sounds active.


MUSCULOSKELETAL: Extremities without clubbing, cyanosis, or edema. 


NEUROLOGICAL: Awake and alert. Cranial nerves II through XII intact.  Motor and 

sensory grossly within normal limits. Five out of 5 muscle strength in all 

muscle groups.  Normal speech.


Laboratory





Laboratory Tests








Test


  3/18/18


03:45


 


White Blood Count 11.0 


 


Red Blood Count 3.69 


 


Hemoglobin 10.5 


 


Hematocrit 31.8 


 


Mean Corpuscular Volume 86.2 


 


Mean Corpuscular Hemoglobin 28.6 


 


Mean Corpuscular Hemoglobin


Concent 33.2 


 


 


Red Cell Distribution Width 13.5 


 


Platelet Count 509 


 


Mean Platelet Volume 7.3 


 


Neutrophils (%) (Auto) 77.9 


 


Lymphocytes (%) (Auto) 17.3 


 


Monocytes (%) (Auto) 3.3 


 


Eosinophils (%) (Auto) 0.9 


 


Basophils (%) (Auto) 0.6 


 


Neutrophils # (Auto) 8.6 


 


Lymphocytes # (Auto) 1.9 


 


Monocytes # (Auto) 0.4 


 


Eosinophils # (Auto) 0.1 


 


Basophils # (Auto) 0.1 


 


CBC Comment DIFF FINAL 


 


Differential Comment  


 


Urine Color YELLOW 


 


Urine Turbidity HAZY 


 


Urine pH 7.5 


 


Urine Specific Gravity 1.025 


 


Urine Protein 30 


 


Urine Glucose (UA) NEG 


 


Urine Ketones NEG 


 


Urine Occult Blood NEG 


 


Urine Nitrite POS 


 


Urine Bilirubin NEG 


 


Urine Urobilinogen LESS THAN 2.0 


 


Urine Leukocyte Esterase LARGE 


 


Urine RBC 4 


 


Urine WBC 68 


 


Urine Squamous Epithelial


Cells 8 


 


 


Urine Amorphous Sediment OCC 


 


Urine Bacteria MOD 


 


Urine Mucus FEW 


 


Microscopic Urinalysis Comment


  CULTURE


INDICATED


 


Blood Urea Nitrogen 12 


 


Creatinine 1.16 


 


Random Glucose 130 


 


Total Protein 8.2 


 


Albumin 3.6 


 


Calcium Level 8.3 


 


Alkaline Phosphatase 62 


 


Aspartate Amino Transf


(AST/SGOT) 14 


 


 


Alanine Aminotransferase


(ALT/SGPT) 15 


 


 


Total Bilirubin 0.2 


 


Sodium Level 140 


 


Potassium Level 3.4 


 


Chloride Level 106 


 


Carbon Dioxide Level 25.1 


 


Anion Gap 9 


 


Estimat Glomerular Filtration


Rate 64 


 


 


Lipase 117 














 Date/Time


Source Procedure


Growth Status


 


 


 3/18/18 03:45


Urine Random Urine Urine Culture


Pending Received








Result Diagram:  


3/18/18 0345                                                                   

             3/18/18 0345





Imaging





Last Impressions








Abdomen/Pelvis CT 3/18/18 0000 Signed





Impressions: 





 Service Date/Time:  2018 05:59 - CONCLUSION:  1. There 

appears 





 to be some mild pelvocaliectasis of the left renal collecting system with mild 





 hydroureter of uncertain etiology. No obvious stone disease identified in the 





 distal ureter. 2. 4 mm calcification adjacent to the left ureter at the 





 lumbosacral junction I believe is a phlebolith associated with the left 

gonadal 





 vein. 3. 4 x 7 mm stone in the lower pole collecting system of the left 

kidney.  





    Scott D. Klioze, MD Caprini VTE Risk Assessment


Caprini VTE Risk Assessment:  No/Low Risk (score <= 1)


Caprini Risk Assessment Model











 Point Value = 1          Point Value = 2  Point Value = 3  Point Value = 5


 


Age 41-60


Minor surgery


BMI > 25 kg/m2


Swollen legs


Varicose veins


Pregnancy or postpartum


History of unexplained or recurrent


   spontaneous 


Oral contraceptives or hormone


   replacement


Sepsis (< 1 month)


Serious lung disease, including


   pneumonia (< 1 month)


Abnormal pulmonary function


Acute myocardial infarction


Congestive heart failure (< 1 month)


History of inflammatory bowel disease


Medical patient at bed rest Age 61-74


Arthroscopic surgery


Major open surgery (> 45 min)


Laparoscopic surgery (> 45 min)


Malignancy


Confined to bed (> 72 hours)


Immobilizing plaster cast


Central venous access Age >= 75


History of VTE


Family history of VTE


Factor V Leiden


Prothrombin 29791I


Lupus anticoagulant


Anticardiolipin antibodies


Elevated serum homocysteine


Heparin-induced thrombocytopenia


Other congenital or acquired


   thrombophilia Stroke (< 1 month)


Elective arthroplasty


Hip, pelvis, or leg fracture


Acute spinal cord injury (< 1 month)








Prophylaxis Regimen











   Total Risk


Factor Score Risk Level Prophylaxis Regimen


 


0-1      Low Early ambulation


 


2 Moderate Order ONE of the following:


*Sequential Compression Device (SCD)


*Heparin 5000 units SQ BID


 


3-4 Higher Order ONE of the following medications:


*Heparin 5000 units SQ TID


*Enoxaparin/Lovenox 40 mg SQ daily (WT < 150 kg, CrCl > 30 mL/min)


*Enoxaparin/Lovenox 30 mg SQ daily (WT < 150 kg, CrCl > 10-29 mL/min)


*Enoxaparin/Lovenox 30 mg SQ BID (WT < 150 kg, CrCl > 30 mL/min)


AND/OR


*Sequential Compression Device (SCD)


 


5 or more Highest Order ONE of the following medications:


*Heparin 5000 units SQ TID (Preferred with Epidurals)


*Enoxaparin/Lovenox 40 mg SQ daily (WT < 150 kg, CrCl > 30 mL/min)


*Enoxaparin/Lovenox 30 mg SQ daily (WT < 150 kg, CrCl > 10-29 mL/min)


*Enoxaparin/Lovenox 30 mg SQ BID (WT < 150 kg, CrCl > 30 mL/min)


AND


*Sequential Compression Device (SCD)











Assessment and Plan


Problem List:  


(1) Kidney stone


ICD Code:  N20.0 - Calculus of kidney


Assessment and Plan


Patient is a 37-year-old female with primary medical history of anxiety, 

depression, bipolar disorder who came into the hospital for complaints of 

severe flank pain.








Renal calculus


   -CT of the abdomen and pelvis showed 1.  There appears to be some mild 

pelvocaliectasis of the left renal collecting system with mild hydroureter of 

uncertain etiology.  No obvious stone disease identified in the distal ureter.


2.  4 mm calcification adjacent to the left ureter at the lumbosacral junction 

believed to be  phlebolith associated with the left gonadal vein. 3.  4 x 7 mm 

stone lower pole collecting system of the left kidney.


   -Urology consulted for further evaluation recommendation


   -IV fluids for hydration


   -IV morphine, Norcos


   -Monitor labs





Urinary tract infection


   -Left flank pain probably aggravated by urinary tract infection


   -UA positive, cultures pending


   -Started on ceftriaxone





Depression, anxiety, bipolar disorder


   -Restart home medication





DVT prop SCDs, early ambulation




















This note was transcribed by LRORI Roland .  I, Dr. Nelly Jiang 

personally performed the history, physical exam, and medical decision making; 

and confirmed the accuracy of the information in the transcribed note.





Authenticated by Dr. Nelly Jiang on 3/18/18 at 12:03.


Code Status


Full code


Discussed Condition With


Patient, nursing











Chris Saenz Mar 18, 2018 12:04


Nelly Jiang MD Mar 18, 2018 12:18

## 2018-03-18 NOTE — RADRPT
EXAM DATE/TIME:  03/18/2018 05:59 

 

[facility] COMPARISON:     

No previous studies available for comparison.

 

 

INDICATIONS :     

Left flank and abdomen pain.

                  

 

ORAL CONTRAST:      

No oral contrast ingested.

                  

 

RADIATION DOSE:     

13.76 CTDIvol (mGy) 

 

 

MEDICAL HISTORY :     

Renal calculi.  

 

SURGICAL HISTORY :      

Tubal ligation. 

 

ENCOUNTER:      

Initial

 

ACUITY:      

1 day

 

PAIN SCALE:      

10/10

 

LOCATION:       

Left flank 

 

TECHNIQUE:     

Volumetric scanning of the abdomen and pelvis was performed.  Using automated exposure control and ad
justment of the mA and/or kV according to patient size, radiation dose was kept as low as reasonably 
achievable to obtain optimal diagnostic quality images.  DICOM format image data is available electro
nically for review and comparison.  

 

FINDINGS:     

 

LOWER LUNGS:     

The visualized lower lungs are clear.

 

LIVER:     

Homogeneous density without lesion.  There is no dilation of the biliary tree.  No calcified gallston
es.

 

SPLEEN:     

Normal size without lesion.

 

PANCREAS:     

Within normal limits. 

 

KIDNEYS:     

Normal in size and shape. 4 x 7 mm nonobstructing stone in the lower pole collecting system of the le
ft kidney. However, there does appear to be some pelvocaliectasis of the left renal collecting system
 and mild prominence of the ureter. A 4 mm calcification is seen adjacent to the ureter at the lumbos
acral junction which I believe represents a phlebolith in the adjacent gonadal vein. No obvious urete
rayna stone.

 

ADRENAL GLANDS:     

Within normal limits.

 

VASCULAR:     

There is no aortic aneurysm.

 

BOWEL/MESENTERY:     

The stomach, small bowel, and colon demonstrate no acute abnormality.  There is no free intraperitone
al air or fluid. 

 

ABDOMINAL WALL:     

Within normal limits.

 

RETROPERITONEUM:     

There is no lymphadenopathy.

 

BLADDER:     

No wall thickening or mass.

 

REPRODUCTIVE:     

Within normal limits.

 

INGUINAL:     

There is no lymphadenopathy or hernia.

 

MUSCULOSKELETAL:     

Within normal limits for patient age.

 

CONCLUSION:     

1. There appears to be some mild pelvocaliectasis of the left renal collecting system with mild hydro
ureter of uncertain etiology. No obvious stone disease identified in the distal ureter.

2. 4 mm calcification adjacent to the left ureter at the lumbosacral junction I believe is a phleboli
th associated with the left gonadal vein.

3. 4 x 7 mm stone in the lower pole collecting system of the left kidney.

 

 

 

 Alonzo Browne MD on March 18, 2018 at 6:32           

Board Certified Radiologist.

 This report was verified electronically.

## 2018-03-19 VITALS
TEMPERATURE: 100 F | OXYGEN SATURATION: 97 % | DIASTOLIC BLOOD PRESSURE: 76 MMHG | SYSTOLIC BLOOD PRESSURE: 129 MMHG | RESPIRATION RATE: 18 BRPM | HEART RATE: 98 BPM

## 2018-03-19 VITALS
DIASTOLIC BLOOD PRESSURE: 75 MMHG | SYSTOLIC BLOOD PRESSURE: 122 MMHG | HEART RATE: 105 BPM | RESPIRATION RATE: 18 BRPM | OXYGEN SATURATION: 97 % | TEMPERATURE: 99.1 F

## 2018-03-19 VITALS
RESPIRATION RATE: 18 BRPM | HEART RATE: 102 BPM | OXYGEN SATURATION: 97 % | SYSTOLIC BLOOD PRESSURE: 126 MMHG | TEMPERATURE: 99.2 F | DIASTOLIC BLOOD PRESSURE: 73 MMHG

## 2018-03-19 VITALS
TEMPERATURE: 100.5 F | RESPIRATION RATE: 18 BRPM | DIASTOLIC BLOOD PRESSURE: 63 MMHG | OXYGEN SATURATION: 95 % | HEART RATE: 121 BPM | SYSTOLIC BLOOD PRESSURE: 126 MMHG

## 2018-03-19 VITALS
RESPIRATION RATE: 18 BRPM | SYSTOLIC BLOOD PRESSURE: 139 MMHG | DIASTOLIC BLOOD PRESSURE: 77 MMHG | HEART RATE: 122 BPM | TEMPERATURE: 97.6 F | OXYGEN SATURATION: 96 %

## 2018-03-19 LAB
BASOPHILS # BLD AUTO: 0.1 TH/MM3 (ref 0–0.2)
BASOPHILS NFR BLD: 0.6 % (ref 0–2)
BUN SERPL-MCNC: 8 MG/DL (ref 7–18)
CALCIUM SERPL-MCNC: 7.9 MG/DL (ref 8.5–10.1)
CHLORIDE SERPL-SCNC: 105 MEQ/L (ref 98–107)
CREAT SERPL-MCNC: 0.94 MG/DL (ref 0.5–1)
EOSINOPHIL # BLD: 0 TH/MM3 (ref 0–0.4)
EOSINOPHIL NFR BLD: 0 % (ref 0–4)
ERYTHROCYTE [DISTWIDTH] IN BLOOD BY AUTOMATED COUNT: 13.8 % (ref 11.6–17.2)
GFR SERPLBLD BASED ON 1.73 SQ M-ARVRAT: 82 ML/MIN (ref 89–?)
GLUCOSE SERPL-MCNC: 95 MG/DL (ref 74–106)
HCO3 BLD-SCNC: 22.2 MEQ/L (ref 21–32)
HCT VFR BLD CALC: 28.8 % (ref 35–46)
HGB BLD-MCNC: 9.5 GM/DL (ref 11.6–15.3)
LYMPHOCYTES # BLD AUTO: 1 TH/MM3 (ref 1–4.8)
LYMPHOCYTES NFR BLD AUTO: 9.6 % (ref 9–44)
MCH RBC QN AUTO: 28.5 PG (ref 27–34)
MCHC RBC AUTO-ENTMCNC: 33 % (ref 32–36)
MCV RBC AUTO: 86.1 FL (ref 80–100)
MONOCYTE #: 0.5 TH/MM3 (ref 0–0.9)
MONOCYTES NFR BLD: 4.8 % (ref 0–8)
NEUTROPHILS # BLD AUTO: 8.4 TH/MM3 (ref 1.8–7.7)
NEUTROPHILS NFR BLD AUTO: 85 % (ref 16–70)
PLATELET # BLD: 366 TH/MM3 (ref 150–450)
PMV BLD AUTO: 7.8 FL (ref 7–11)
RBC # BLD AUTO: 3.35 MIL/MM3 (ref 4–5.3)
SODIUM SERPL-SCNC: 136 MEQ/L (ref 136–145)
WBC # BLD AUTO: 9.9 TH/MM3 (ref 4–11)

## 2018-03-19 RX ADMIN — ESCITALOPRAM OXALATE SCH MG: 10 TABLET, FILM COATED ORAL at 08:52

## 2018-03-19 RX ADMIN — Medication SCH ML: at 22:38

## 2018-03-19 RX ADMIN — STANDARDIZED SENNA CONCENTRATE AND DOCUSATE SODIUM SCH TAB: 8.6; 5 TABLET, FILM COATED ORAL at 08:52

## 2018-03-19 RX ADMIN — PHENYTOIN SODIUM SCH MLS/HR: 50 INJECTION INTRAMUSCULAR; INTRAVENOUS at 15:31

## 2018-03-19 RX ADMIN — PHENYTOIN SODIUM SCH MLS/HR: 50 INJECTION INTRAMUSCULAR; INTRAVENOUS at 04:00

## 2018-03-19 RX ADMIN — SODIUM CHLORIDE SCH MLS/HR: 900 INJECTION INTRAVENOUS at 08:53

## 2018-03-19 RX ADMIN — STANDARDIZED SENNA CONCENTRATE AND DOCUSATE SODIUM SCH TAB: 8.6; 5 TABLET, FILM COATED ORAL at 22:38

## 2018-03-19 RX ADMIN — Medication SCH ML: at 08:53

## 2018-03-19 NOTE — HHI.DS
__________________________________________________





Discharge Summary


Admission Date


Mar 18, 2018 at 08:12


Discharge Date:  Mar 19, 2018


Admitting Diagnosis





UTI, obstructing renal stone





(1) Kidney stone


ICD Code:  N20.0 - Calculus of kidney


Procedures


No procedures


Brief History - From Admission


Written by Chris Qiu, acting as scribe for Dr. Jiang on 3/18/18 at 12:03. 





Patient is a 37-year-old female with primary medical history of anxiety, 

depression, bipolar disorder who came into the hospital for complaints of 

severe flank pain.  Patient states that she was celebrating last night for [place] [place] Day and had some vodka, but at around 2:00 in the morning she was 

having some severe pain on the left side area, flank area.  On exam, states 

that the pain has improved after getting antibiotics and pain medication.  

States that this pain that she had is not actually worse than when she had her 

previous kidney stone.  Denies SOB/ dyspnea.  Denies chest pain, palpitations, 

headaches, dizziness. Denies fevers, chills, n/v/d.  Denies hematuria, dysuria.


CBC/BMP:  


3/18/18 0345                                                                   

             3/18/18 0345





Significant Findings





Laboratory Tests








Test


  3/18/18


03:45


 


Red Blood Count


  3.69 MIL/MM3


(4.00-5.30)


 


Hemoglobin


  10.5 GM/DL


(11.6-15.3)


 


Hematocrit


  31.8 %


(35.0-46.0)


 


Platelet Count


  509 TH/MM3


(150-450)


 


Neutrophils (%) (Auto)


  77.9 %


(16.0-70.0)


 


Neutrophils # (Auto)


  8.6 TH/MM3


(1.8-7.7)


 


Urine Turbidity HAZY (CLEAR) 


 


Urine Protein


  30 mg/dL


(NEG-TRACE)


 


Urine Nitrite POS (NEG) 


 


Urine Leukocyte Esterase LARGE (NEG) 


 


Urine RBC 4 /hpf (0-3) 


 


Urine WBC 68 /hpf (0-5) 


 


Urine Bacteria


  MOD /hpf


(NONE)


 


Urine Mucus FEW /lpf (OCC) 


 


Creatinine


  1.16 MG/DL


(0.50-1.00)


 


Random Glucose


  130 MG/DL


()


 


Calcium Level


  8.3 MG/DL


(8.5-10.1)


 


Aspartate Amino Transf


(AST/SGOT) 14 U/L (15-37) 


 


 


Potassium Level


  3.4 MEQ/L


(3.5-5.1)


 


Estimat Glomerular Filtration


Rate 64 ML/MIN


(>89)








Imaging





Last Impressions








Abdomen/Pelvis CT 3/18/18 0000 Signed





Impressions: 





 Service Date/Time:  Sunday, March 18, 2018 05:59 - CONCLUSION:  1. There 

appears 





 to be some mild pelvocaliectasis of the left renal collecting system with mild 





 hydroureter of uncertain etiology. No obvious stone disease identified in the 





 distal ureter. 2. 4 mm calcification adjacent to the left ureter at the 





 lumbosacral junction I believe is a phlebolith associated with the left 

gonadal 





 vein. 3. 4 x 7 mm stone in the lower pole collecting system of the left 

kidney.  





    Alonzo Browne MD 








PE at Discharge


GENERAL: This is a well-nourished, well-developed patient, in no apparent 

distress.


CARDIOVASCULAR: Regular rate and rhythm without murmurs, gallops, or rubs. 


RESPIRATORY: Clear to auscultation. Breath sounds equal bilaterally. No wheezes

, rales, or rhonchi.  


GASTROINTESTINAL: Abdomen soft, nondistended. No tenderness. No rebound. 

Negative CVA tenderness.  Bowel sounds active.


MUSCULOSKELETAL: Extremities without clubbing, cyanosis, or edema. 


NEUROLOGICAL: Awake and alert. Cranial nerves II through XII intact.  Motor and 

sensory grossly within normal limits. Five out of 5 muscle strength in all 

muscle groups.  Normal speech.





Pt update on day of discharge


Feels better.  Pain is controlled by medications.  No nausea or vomiting no 

diarrhea or constipation.  She is eating well.  Urinating better no blood in 

the urine no fever chills overnight.


Hospital Course


Patient is a 37-year-old female with primary medical history of anxiety, 

depression, bipolar disorder who came into the hospital for complaints of 

severe flank pain.








Renal calculus


   -CT of the abdomen and pelvis showed 1.  There appears to be some mild 

pelvocaliectasis of the left renal collecting system with mild hydroureter of 

uncertain etiology.  No obvious stone disease identified in the distal ureter.


2.  4 mm calcification adjacent to the left ureter at the lumbosacral junction 

believed to be  phlebolith associated with the left gonadal vein. 3.  4 x 7 mm 

stone lower pole collecting system of the left kidney.


   -Urology consulted for further evaluation recommendation


   -IV fluids for hydration


   -IV morphine, Norcos


   -Monitor labs





Urinary tract infection


   -Left flank pain probably aggravated by urinary tract infection


   -UA positive, cultures with gram-negative awaiting sensitivities 


   -On ceftriaxone





Depression, anxiety, bipolar disorder


   -Restart home medication





DVT prop SCDs, early ambulation








Discussed with the patient, nurse





Patient improved.  Discharged home in stable condition to follow-up with PCP 

and consultants as outpatient.


Pt Condition on Discharge:  Stable


Discharge Disposition:  Discharge Home


Discharge Time:  > 30 minutes


Discharge Instructions


DIET: Follow Instructions for:  As Tolerated, No Restrictions


Activities you can perform:  Regular-No Restrictions


Follow up Referrals:  


PCP Follow-up - 2-3 Days


Urology - 2 Weeks with Marcelo Calle MD





New Medications:  


Cefuroxime (Ceftin) 250 Mg Tab


250 MG PO BID for Infection for 7 Days, #14 TAB





 


Continued Medications:  


Escitalopram (Lexapro) 10 Mg Tab


10 MG PO DAILY, #30 TAB 0 Refills





Lamotrigine (Lamictal) 200 Mg Tab


200 MG PO DAILY for Control Seizures, #30 TAB 0 Refills





Quetiapine (Seroquel) 25 Mg Tab


25 MG PO HS, #30 TAB 0 Refills

















Nelly Jiang MD Mar 19, 2018 08:09

## 2018-03-19 NOTE — HHI.PR
Subjective


Remarks


Patient is in bed.  Says pain is better controlled by medications.  No trouble 

with urination.  No fever or chills.  No nausea vomiting no diarrhea or 

constipation.





Objective


Vitals





Vital Signs








  Date Time  Temp Pulse Resp B/P (MAP) Pulse Ox O2 Delivery O2 Flow Rate FiO2


 


3/19/18 12:06 97.6 122 18 139/77 (97) 96   


 


3/19/18 09:21 99.1 105 18 122/75 (91) 97   


 


3/19/18 04:02 100.5 121 18 126/63 (84) 95   


 


3/18/18 19:57        21


 


3/18/18 19:34 100.2 124 18 122/69 (86) 97   


 


3/18/18 18:47 99.4 120 17 115/64 (81) 99   














I/O      


 


 3/18/18 3/18/18 3/18/18 3/19/18 3/19/18 3/19/18





 07:00 15:00 23:00 07:00 15:00 23:00


 


Intake Total  1100 ml  420 ml 100 ml 


 


Balance  1100 ml  420 ml 100 ml 


 


      


 


Intake Oral    420 ml  


 


IV Total  1100 ml   100 ml 


 


# Voids    3  








Result Diagram:  


3/19/18 0915                                                                   

             3/19/18 0915





Imaging





Last Impressions








Abdomen/Pelvis CT 3/18/18 0000 Signed





Impressions: 





 Service Date/Time:  Sunday, March 18, 2018 05:59 - CONCLUSION:  1. There 

appears 





 to be some mild pelvocaliectasis of the left renal collecting system with mild 





 hydroureter of uncertain etiology. No obvious stone disease identified in the 





 distal ureter. 2. 4 mm calcification adjacent to the left ureter at the 





 lumbosacral junction I believe is a phlebolith associated with the left 

gonadal 





 vein. 3. 4 x 7 mm stone in the lower pole collecting system of the left 

kidney.  





    Alonzo Browne MD 








Objective Remarks


GENERAL: This is a well-nourished, well-developed patient, in no apparent 

distress.


CARDIOVASCULAR: Regular rate and rhythm without murmurs, gallops, or rubs. 


RESPIRATORY: Clear to auscultation. Breath sounds equal bilaterally. No wheezes

, rales, or rhonchi.  


GASTROINTESTINAL: Abdomen soft, nondistended.  Mild tenderness to palpate left 

quadrant, flank area.  Bowel sounds active.


MUSCULOSKELETAL: Extremities without clubbing, cyanosis, or edema. 


NEUROLOGICAL: Awake and alert. Cranial nerves II through XII intact.  Motor and 

sensory grossly within normal limits. Five out of 5 muscle strength in all 

muscle groups.  Normal speech.





A/P


Problem List:  


(1) Kidney stone


ICD Code:  N20.0 - Calculus of kidney


Assessment and Plan





Patient is a 37-year-old female with primary medical history of anxiety, 

depression, bipolar disorder who came into the hospital for complaints of 

severe flank pain.








Renal calculus


   -CT of the abdomen and pelvis showed 1.  There appears to be some mild 

pelvocaliectasis of the left renal collecting system with mild hydroureter of 

uncertain etiology.  No obvious stone disease identified in the distal ureter.


2.  4 mm calcification adjacent to the left ureter at the lumbosacral junction 

believed to be  phlebolith associated with the left gonadal vein. 3.  4 x 7 mm 

stone lower pole collecting system of the left kidney.


   -Urology consulted for further evaluation recommendation


   -IV fluids for hydration


   -IV morphine, Norcos


   -Monitor labs





Urinary tract infection


   -Left flank pain probably aggravated by urinary tract infection


   -UA positive, cultures with gram-negative awaiting sensitivities 


   -On ceftriaxone





Depression, anxiety, bipolar disorder


   -Restart home medication





DVT prop SCDs, early ambulation








Discussed with the patient, nurse





DC when have sensitivities back. Patient has a h/o recurrent kidney stones and 

UTIs in the past will await sensitivity











Nelly Jiang MD Mar 19, 2018 15:34

## 2018-03-19 NOTE — HHI.DCPOC
Discharge Care Plan


Diagnosis:  


(1) Kidney stone








Your Health Problems Are: Inflammation





 Swelling








Goals to Promote Your Health


* To prevent worsening of your condition and complications


* To maintain your health at the optimal level


Directions to Meet Your Goals


*** Take your medications as prescribed


*** Follow your dietary instruction


*** Follow activity as directed








*** Keep your appointments as scheduled


*** Take your immunizations and boosters as scheduled


*** If your symptoms worsen call your PCP, if no PCP go to Urgent Care Center 

or Emergency Room***


*** Smoking is Dangerous to Your Health. Avoid second hand smoke***


***Call the 24-hour hour crisis hotline for domestic abuse at 1-261.603.3669***











Chris Saenz Mar 19, 2018 11:04

## 2018-03-20 VITALS
RESPIRATION RATE: 16 BRPM | DIASTOLIC BLOOD PRESSURE: 79 MMHG | SYSTOLIC BLOOD PRESSURE: 125 MMHG | HEART RATE: 87 BPM | OXYGEN SATURATION: 96 % | TEMPERATURE: 98.2 F

## 2018-03-20 LAB
BASOPHILS # BLD AUTO: 0.1 TH/MM3 (ref 0–0.2)
BASOPHILS NFR BLD: 0.9 % (ref 0–2)
BUN SERPL-MCNC: 7 MG/DL (ref 7–18)
CALCIUM SERPL-MCNC: 8 MG/DL (ref 8.5–10.1)
CHLORIDE SERPL-SCNC: 109 MEQ/L (ref 98–107)
CREAT SERPL-MCNC: 0.81 MG/DL (ref 0.5–1)
EOSINOPHIL # BLD: 0 TH/MM3 (ref 0–0.4)
EOSINOPHIL NFR BLD: 0.4 % (ref 0–4)
ERYTHROCYTE [DISTWIDTH] IN BLOOD BY AUTOMATED COUNT: 13.7 % (ref 11.6–17.2)
GFR SERPLBLD BASED ON 1.73 SQ M-ARVRAT: 97 ML/MIN (ref 89–?)
GLUCOSE SERPL-MCNC: 84 MG/DL (ref 74–106)
HCO3 BLD-SCNC: 24.3 MEQ/L (ref 21–32)
HCT VFR BLD CALC: 29.3 % (ref 35–46)
HGB BLD-MCNC: 9.6 GM/DL (ref 11.6–15.3)
LYMPHOCYTES # BLD AUTO: 1.2 TH/MM3 (ref 1–4.8)
LYMPHOCYTES NFR BLD AUTO: 18 % (ref 9–44)
MCH RBC QN AUTO: 28.3 PG (ref 27–34)
MCHC RBC AUTO-ENTMCNC: 32.8 % (ref 32–36)
MCV RBC AUTO: 86.3 FL (ref 80–100)
MONOCYTE #: 0.5 TH/MM3 (ref 0–0.9)
MONOCYTES NFR BLD: 6.9 % (ref 0–8)
NEUTROPHILS # BLD AUTO: 5 TH/MM3 (ref 1.8–7.7)
NEUTROPHILS NFR BLD AUTO: 73.8 % (ref 16–70)
PLATELET # BLD: 346 TH/MM3 (ref 150–450)
PMV BLD AUTO: 7.6 FL (ref 7–11)
RBC # BLD AUTO: 3.39 MIL/MM3 (ref 4–5.3)
SODIUM SERPL-SCNC: 140 MEQ/L (ref 136–145)
WBC # BLD AUTO: 6.7 TH/MM3 (ref 4–11)

## 2018-03-20 RX ADMIN — STANDARDIZED SENNA CONCENTRATE AND DOCUSATE SODIUM SCH TAB: 8.6; 5 TABLET, FILM COATED ORAL at 08:45

## 2018-03-20 RX ADMIN — SODIUM CHLORIDE SCH MLS/HR: 900 INJECTION INTRAVENOUS at 08:45

## 2018-03-20 RX ADMIN — ESCITALOPRAM OXALATE SCH MG: 10 TABLET, FILM COATED ORAL at 08:43

## 2018-03-20 RX ADMIN — PHENYTOIN SODIUM SCH MLS/HR: 50 INJECTION INTRAMUSCULAR; INTRAVENOUS at 08:44

## 2018-03-20 RX ADMIN — PHENYTOIN SODIUM SCH MLS/HR: 50 INJECTION INTRAMUSCULAR; INTRAVENOUS at 00:00

## 2018-03-20 RX ADMIN — Medication SCH ML: at 08:44

## 2018-05-09 ENCOUNTER — HOSPITAL ENCOUNTER (OUTPATIENT)
Dept: HOSPITAL 17 - HSDC | Age: 38
Discharge: HOME | End: 2018-05-09
Attending: UROLOGY
Payer: MEDICARE

## 2018-05-09 VITALS
SYSTOLIC BLOOD PRESSURE: 128 MMHG | DIASTOLIC BLOOD PRESSURE: 90 MMHG | RESPIRATION RATE: 16 BRPM | OXYGEN SATURATION: 97 % | TEMPERATURE: 97.5 F | HEART RATE: 74 BPM

## 2018-05-09 VITALS — WEIGHT: 199.08 LBS | BODY MASS INDEX: 31.99 KG/M2 | HEIGHT: 66 IN

## 2018-05-09 DIAGNOSIS — N20.0: Primary | ICD-10-CM

## 2018-05-09 DIAGNOSIS — Z01.818: ICD-10-CM

## 2018-05-09 LAB
BASOPHILS # BLD AUTO: 0.1 TH/MM3 (ref 0–0.2)
BASOPHILS NFR BLD: 1.2 % (ref 0–2)
EOSINOPHIL # BLD: 0.1 TH/MM3 (ref 0–0.4)
EOSINOPHIL NFR BLD: 2 % (ref 0–4)
ERYTHROCYTE [DISTWIDTH] IN BLOOD BY AUTOMATED COUNT: 13.8 % (ref 11.6–17.2)
HCT VFR BLD CALC: 31.9 % (ref 35–46)
HGB BLD-MCNC: 10.5 GM/DL (ref 11.6–15.3)
LYMPHOCYTES # BLD AUTO: 1.5 TH/MM3 (ref 1–4.8)
LYMPHOCYTES NFR BLD AUTO: 28.5 % (ref 9–44)
MCH RBC QN AUTO: 28.2 PG (ref 27–34)
MCHC RBC AUTO-ENTMCNC: 32.8 % (ref 32–36)
MCV RBC AUTO: 86.1 FL (ref 80–100)
MONOCYTE #: 0.2 TH/MM3 (ref 0–0.9)
MONOCYTES NFR BLD: 3.8 % (ref 0–8)
NEUTROPHILS # BLD AUTO: 3.3 TH/MM3 (ref 1.8–7.7)
NEUTROPHILS NFR BLD AUTO: 64.5 % (ref 16–70)
PLATELET # BLD: 438 TH/MM3 (ref 150–450)
PMV BLD AUTO: 8 FL (ref 7–11)
RBC # BLD AUTO: 3.71 MIL/MM3 (ref 4–5.3)
WBC # BLD AUTO: 5.1 TH/MM3 (ref 4–11)

## 2018-05-09 PROCEDURE — 85025 COMPLETE CBC W/AUTO DIFF WBC: CPT

## 2018-05-09 PROCEDURE — 50590 FRAGMENTING OF KIDNEY STONE: CPT

## 2018-05-09 PROCEDURE — 00872 ANES LITHOTRP ESW WATER BATH: CPT

## 2018-05-09 PROCEDURE — 74018 RADEX ABDOMEN 1 VIEW: CPT

## 2018-05-09 NOTE — RADRPT
EXAM DATE/TIME:  05/09/2018 10:38 

 

[facility] COMPARISON:     

CT ABDOMEN & PELVIS W/O CONTRAST, March 18, 2018, 5:59.

 

                     

INDICATIONS :     

Evaluate for pneumonia, pneumothorax and communicable disease. Pre op kidney stones today.

                     

 

MEDICAL HISTORY :     

Renal calculi.          

 

SURGICAL HISTORY :     

Tubal ligation.   

 

ENCOUNTER:     

Initial                                        

 

ACUITY:     

1 day      

 

PAIN SCORE:     

0/10

 

LOCATION:     

Bilateral  abdomen

 

FINDINGS:     

Supine view of the abdomen was performed.  The abdominal bowel gas pattern is normal.  No abnormal ma
sses, calcifications, or organomegaly is seen.  The osseous structures are unremarkable.

 

CONCLUSION:     Normal examination.  

 

 

 

 Apolinar Coe MD on May 09, 2018 at 11:05           

Board Certified Radiologist.

 This report was verified electronically.

## 2018-05-09 NOTE — PD.OP
__________________________________________________





Operative Report


Date of Surgery:  May 9, 2018


Preoperative Diagnosis:  


(1) Renal calculus, left


Postoperative Diagnosis:  


(1) Renal calculus, left


Procedure:


Extracorporeal shockwave lithotripsy left lower pole renal calculus


Anesthesia:


General


Surgeon:


Marcelo Calle


Assistant(s):


None


Operation and Findings:


Indication for procedure: Case of a pleasant 38-year-old female with a 7 mm 

nonobstructing left lower pole renal calculus who presents today to undergo left

-sided extracorporeal shockwave lithotripsy.





Operative procedure in detail: Patient was brought to the operating room suite 

and placed supine on the lithotripsy table.  She was then placed under general 

anesthesia.  After appropriate timeout was undertaken I proceeded with 

localizing the patient's left lower pole renal calculus with fluoroscopy.  She 

subsequently received extra corporeal shockwave lithotripsy utilizing the [employer] 

[employer] 3000 mobile lithotripsy unit.  The patient received a total of 2000 

shocks with a maximum power level setting of 20.  At the conclusion of the 

procedure the stone spread out considerably consistent with fragmentation.  The 

patient tolerated the procedure without complications and was transferred to 

the PACU in satisfactory condition.











Marcelo Calle MD May 9, 2018 13:16